# Patient Record
Sex: FEMALE
[De-identification: names, ages, dates, MRNs, and addresses within clinical notes are randomized per-mention and may not be internally consistent; named-entity substitution may affect disease eponyms.]

---

## 2019-02-21 PROBLEM — Z00.00 ENCOUNTER FOR PREVENTIVE HEALTH EXAMINATION: Status: ACTIVE | Noted: 2019-02-21

## 2019-03-12 ENCOUNTER — APPOINTMENT (OUTPATIENT)
Dept: OBGYN | Facility: CLINIC | Age: 84
End: 2019-03-12
Payer: MEDICARE

## 2019-03-12 VITALS
BODY MASS INDEX: 21.24 KG/M2 | SYSTOLIC BLOOD PRESSURE: 110 MMHG | DIASTOLIC BLOOD PRESSURE: 60 MMHG | HEIGHT: 65 IN | WEIGHT: 127.5 LBS

## 2019-03-12 DIAGNOSIS — M85.80 OTHER SPECIFIED DISORDERS OF BONE DENSITY AND STRUCTURE, UNSPECIFIED SITE: ICD-10-CM

## 2019-03-12 DIAGNOSIS — R92.2 INCONCLUSIVE MAMMOGRAM: ICD-10-CM

## 2019-03-12 DIAGNOSIS — N95.2 POSTMENOPAUSAL ATROPHIC VAGINITIS: ICD-10-CM

## 2019-03-12 DIAGNOSIS — R35.0 FREQUENCY OF MICTURITION: ICD-10-CM

## 2019-03-12 DIAGNOSIS — Z78.0 ASYMPTOMATIC MENOPAUSAL STATE: ICD-10-CM

## 2019-03-12 DIAGNOSIS — Z01.419 ENCOUNTER FOR GYNECOLOGICAL EXAMINATION (GENERAL) (ROUTINE) W/OUT ABNORMAL FINDINGS: ICD-10-CM

## 2019-03-12 DIAGNOSIS — C44.121 SQUAMOUS CELL CARCINOMA OF SKIN OF UNSPECIFIED EYELID, INCLUDING CANTHUS: ICD-10-CM

## 2019-03-12 DIAGNOSIS — M85.852 OTHER SPECIFIED DISORDERS OF BONE DENSITY AND STRUCTURE, RIGHT THIGH: ICD-10-CM

## 2019-03-12 DIAGNOSIS — C34.90 MALIGNANT NEOPLASM OF UNSPECIFIED PART OF UNSPECIFIED BRONCHUS OR LUNG: ICD-10-CM

## 2019-03-12 DIAGNOSIS — M85.851 OTHER SPECIFIED DISORDERS OF BONE DENSITY AND STRUCTURE, RIGHT THIGH: ICD-10-CM

## 2019-03-12 LAB
BILIRUB UR QL STRIP: NORMAL
GLUCOSE UR-MCNC: NORMAL
HCG UR QL: 0.2 EU/DL
HGB UR QL STRIP.AUTO: NORMAL
KETONES UR-MCNC: NORMAL
LEUKOCYTE ESTERASE UR QL STRIP: NORMAL
NITRITE UR QL STRIP: NORMAL
PH UR STRIP: 6
PROT UR STRIP-MCNC: NORMAL
SP GR UR STRIP: 1.01

## 2019-03-12 PROCEDURE — 81002 URINALYSIS NONAUTO W/O SCOPE: CPT

## 2019-03-12 PROCEDURE — 82270 OCCULT BLOOD FECES: CPT

## 2019-03-12 PROCEDURE — G0101: CPT

## 2019-03-12 RX ORDER — ESCITALOPRAM OXALATE 20 MG/1
20 TABLET, FILM COATED ORAL
Refills: 0 | Status: ACTIVE | COMMUNITY

## 2019-03-12 RX ORDER — VERAPAMIL HYDROCHLORIDE 180 MG/1
180 TABLET ORAL
Refills: 0 | Status: ACTIVE | COMMUNITY

## 2019-03-12 RX ORDER — ESTRADIOL 10 UG/1
10 TABLET VAGINAL
Qty: 24 | Refills: 3 | Status: ACTIVE | COMMUNITY
Start: 2019-03-12 | End: 1900-01-01

## 2019-03-12 RX ORDER — LORAZEPAM 0.5 MG/1
0.5 TABLET ORAL
Refills: 0 | Status: ACTIVE | COMMUNITY

## 2019-03-12 RX ORDER — RIVAROXABAN 20 MG/1
20 TABLET, FILM COATED ORAL
Refills: 0 | Status: ACTIVE | COMMUNITY

## 2019-03-12 NOTE — HISTORY OF PRESENT ILLNESS
[1 Year Ago] : 1 year ago [Last Mammogram ___] : Last Mammogram was [unfilled] [Last Colonoscopy ___] : Last colonoscopy [unfilled] [Postmenopausal] : is postmenopausal

## 2019-03-12 NOTE — PHYSICAL EXAM
[Awake] : awake [Alert] : alert [Acute Distress] : no acute distress [Mass] : no breast mass [Nipple Discharge] : no nipple discharge [Axillary LAD] : no axillary lymphadenopathy [Soft] : soft [Tender] : non tender [Oriented x3] : oriented to person, place, and time [Vulvar Atrophy] : vulvar atrophy [Normal] : uterus [Labia Majora] : labia major [Labia Minora] : labia minora [Atrophy] : atrophy [Cystocele] : a cystocele [Dry Mucosa] : dry mucosa [No Bleeding] : there was no active vaginal bleeding [Pap Obtained] : a Pap smear was not performed [Uterine Adnexae] : were not tender and not enlarged [Occult Blood] : occult blood test from digital rectal exam was negative

## 2019-03-12 NOTE — PROCEDURE
[Cervical Pap Smear] : cervical Pap smear [Vaginal Pap Smear] : vaginal Pap smear [Liquid Base] : liquid base [Tolerated Well] : the patient tolerated the procedure well [No Complications] : there were no complications

## 2019-03-18 LAB
BACTERIA UR CULT: NORMAL
CYTOLOGY CVX/VAG DOC THIN PREP: NORMAL

## 2019-04-04 ENCOUNTER — MESSAGE (OUTPATIENT)
Age: 84
End: 2019-04-04

## 2022-07-29 ENCOUNTER — RX ONLY (RX ONLY)
Age: 87
End: 2022-07-29

## 2022-07-29 ENCOUNTER — OFFICE (OUTPATIENT)
Dept: URBAN - METROPOLITAN AREA CLINIC 38 | Facility: CLINIC | Age: 87
Setting detail: OPHTHALMOLOGY
End: 2022-07-29
Payer: MEDICARE

## 2022-07-29 DIAGNOSIS — H01.005: ICD-10-CM

## 2022-07-29 DIAGNOSIS — H01.002: ICD-10-CM

## 2022-07-29 DIAGNOSIS — H00.14: ICD-10-CM

## 2022-07-29 DIAGNOSIS — H02.014: ICD-10-CM

## 2022-07-29 DIAGNOSIS — H01.001: ICD-10-CM

## 2022-07-29 DIAGNOSIS — H01.004: ICD-10-CM

## 2022-07-29 PROCEDURE — 67820 REVISE EYELASHES: CPT | Performed by: OPHTHALMOLOGY

## 2022-07-29 PROCEDURE — 99203 OFFICE O/P NEW LOW 30 MIN: CPT | Performed by: OPHTHALMOLOGY

## 2022-07-29 ASSESSMENT — LID EXAM ASSESSMENTS
OD_BLEPHARITIS: 2+
OS_BLEPHARITIS: 2+
OS_TRICHIASIS: LUL 2+

## 2022-07-29 ASSESSMENT — CONFRONTATIONAL VISUAL FIELD TEST (CVF)
OS_FINDINGS: FULL
OD_FINDINGS: FULL

## 2022-07-30 PROBLEM — H02.014 TRICHIASIS; LEFT UPPER LID: Status: ACTIVE | Noted: 2022-07-29

## 2022-07-30 PROBLEM — C44.1291 SQUAMOUS CELL CARCINOMA OF SKIN OF LEFT UPPER EYELID, INCLUDING CANTHUS: Status: ACTIVE | Noted: 2022-07-29

## 2022-07-30 PROBLEM — H01.00 BLEPHARITIS; RIGHT UPPER LID, RIGHT LOWER LID, LEFT UPPER LID, LEFT LOWER LID: Status: ACTIVE | Noted: 2022-07-29

## 2022-07-30 ASSESSMENT — VISUAL ACUITY
OD_BCVA: 20/125
OS_BCVA: 20/50-2

## 2023-08-07 ENCOUNTER — OFFICE (OUTPATIENT)
Dept: URBAN - METROPOLITAN AREA CLINIC 8 | Facility: CLINIC | Age: 88
Setting detail: OPHTHALMOLOGY
End: 2023-08-07
Payer: MEDICARE

## 2023-08-07 DIAGNOSIS — H02.014: ICD-10-CM

## 2023-08-07 PROBLEM — H25.13 CATARACT SENILE NUCLEAR SCLEROSIS; BOTH EYES: Status: ACTIVE | Noted: 2023-08-07

## 2023-08-07 PROCEDURE — 67820 REVISE EYELASHES: CPT | Performed by: OPHTHALMOLOGY

## 2023-08-07 ASSESSMENT — LID EXAM ASSESSMENTS
OD_BLEPHARITIS: 2+
OS_BLEPHARITIS: 2+
OS_COMMENTS: EUL
OS_TRICHIASIS: LUL 2+

## 2023-08-07 ASSESSMENT — CONFRONTATIONAL VISUAL FIELD TEST (CVF)
OS_FINDINGS: FULL
OD_FINDINGS: FULL

## 2023-08-07 ASSESSMENT — CORNEAL SURGICAL SCARRING: OS_SCARRING: STROMAL

## 2023-08-07 ASSESSMENT — VISUAL ACUITY
OS_BCVA: 20/40-1
OD_BCVA: 20/80